# Patient Record
Sex: FEMALE | Race: BLACK OR AFRICAN AMERICAN | NOT HISPANIC OR LATINO | Employment: OTHER | URBAN - METROPOLITAN AREA
[De-identification: names, ages, dates, MRNs, and addresses within clinical notes are randomized per-mention and may not be internally consistent; named-entity substitution may affect disease eponyms.]

---

## 2017-08-09 ENCOUNTER — TELEPHONE (OUTPATIENT)
Dept: BARIATRICS | Facility: CLINIC | Age: 42
End: 2017-08-09

## 2017-08-09 NOTE — TELEPHONE ENCOUNTER
Leeanna informed me we are not provider for the patients [facility].  Informed her to call benefits on her ins card for providers.  Letter sent to Dr. Zelaya thanking him for the referral.

## 2017-08-09 NOTE — TELEPHONE ENCOUNTER
Left  at [phone] to call about referral received for wt loss surgery. Copy of ins sent to [facility] to check benefits.

## 2021-07-07 ENCOUNTER — HOSPITAL ENCOUNTER (OUTPATIENT)
Dept: RADIOLOGY | Facility: OTHER | Age: 46
Discharge: HOME | End: 2021-07-07
Attending: SURGERY
Payer: MEDICARE

## 2021-07-07 DIAGNOSIS — Z85.3 HX OF BREAST CANCER: ICD-10-CM

## 2021-07-07 PROCEDURE — 74174 CTA ABD&PLVS W/CONTRAST: CPT | Mod: TC

## 2021-07-07 PROCEDURE — 74174 CTA ABD&PLVS W/CONTRAST: CPT | Mod: 26,,, | Performed by: RADIOLOGY

## 2021-07-07 PROCEDURE — 25500020 PHARM REV CODE 255: Performed by: SURGERY

## 2021-07-07 PROCEDURE — 74174: ICD-10-PCS | Mod: 26,,, | Performed by: RADIOLOGY

## 2021-07-07 RX ADMIN — IOHEXOL 100 ML: 350 INJECTION, SOLUTION INTRAVENOUS at 04:07
